# Patient Record
(demographics unavailable — no encounter records)

---

## 2024-11-05 NOTE — HISTORY OF PRESENT ILLNESS
[FreeTextEntry1] : 37-year-old woman with history of venous thromboembolism, lumbar radiculopathy, and venous insufficiency with right lower extremity post-thrombotic syndrome presents for evaluation of right external iliac vein thrombosis. She has significant discomfort and swelling in the right leg. She has difficulty ambulating and/or exercising at times. She has decreased quality of life as a result of her right lower post-thrombotic syndrome and recurrent deep venous thrombosis. She denies claudication or rest pain.

## 2024-11-05 NOTE — PHYSICAL EXAM
Ongoing SW/CM Assessment/Plan of Care Note     See SW/CM flowsheets for goals and other objective data.    Patient/Family discharge goal (s):  Goal #1: Psychosocial needs assessed          PT Recommendation:     Recommendation for Discharge: PT IL: Patient requires  intermittent assistance to perform mobility and/or ADLs safely, Patient is appropriate for Physical Therapy 1-3 times per week    OT Recommendation:     Recommendations for Discharge: OT IL: Patient requires  intermittent assistance to perform mobility and/or ADLs safely            Disposition:Home with HH services       Progress note:   Asked to call patient daughter, per bedside nurse. Call placed Daughter is agreeable to HH services for mom at her apartment. Address verified. Daughter inquired about transportation needs to and from HD. SW assist Cm reaching out to SW.    [facility]  HOME HEALTH  Was [facility] offered: Yes  Was [facility] chosen by patient:Yes            [Respiratory Effort] : normal respiratory effort [Normal Rate and Rhythm] : normal rate and rhythm [2+] : left 2+ [Ankle Swelling (On Exam)] : present [Ankle Swelling On The Left] : moderate [Varicose Veins Of Lower Extremities] : present [Varicose Veins Of The Right Leg] : of the right leg [] : of the right leg [Ankle Swelling On The Right] : mild [Abdomen Tenderness] : ~T ~M No abdominal tenderness [Skin Ulcer] : no ulcer [Alert] : alert [Oriented to Person] : oriented to person [Oriented to Place] : oriented to place [Oriented to Time] : oriented to time [Calm] : calm [de-identified] : appears stated age [de-identified] : normocephalic, atraumatic [de-identified] : supple

## 2024-11-05 NOTE — ASSESSMENT
[FreeTextEntry1] : Problem #1 recurrent subacute/chronic deep venous thrombosis of right external iliac vein Problem #2 post-thrombotic syndrome of right leg due to patient's young age and symptomatology with venous insufficiency and presence of recurrent DVT will schedule for right lower extremity venogram, possible thrombectomy, possible intravascular ultrasound, possible angioplasty, possible stent continue anticoagulation until 24 hours prior to procedure

## 2024-12-05 NOTE — HISTORY OF PRESENT ILLNESS
[FreeTextEntry1] : 37-year-old woman with history of venous thromboembolism, lumbar radiculopathy, and venous insufficiency with right lower extremity post-thrombotic syndrome presents for evaluation of right external iliac vein thrombosis. She has significant discomfort and swelling in the right leg. She has difficulty ambulating and/or exercising at times. She has decreased quality of life as a result of her right lower post-thrombotic syndrome and recurrent deep venous thrombosis. She denies claudication or rest pain.  11/15/24 - R iliac vein thrombectomy, IVUS, PTA and stent [de-identified] : States her right leg feels much better. She also has had improvement in her breathing since the procedure. She does have some intermittent right groin pain radiating around her waist.

## 2024-12-05 NOTE — PHYSICAL EXAM
[Respiratory Effort] : normal respiratory effort [Normal Rate and Rhythm] : normal rate and rhythm [2+] : left 2+ [Ankle Swelling (On Exam)] : present [Varicose Veins Of Lower Extremities] : present [Varicose Veins Of The Right Leg] : of the right leg [] : of the right leg [Ankle Swelling On The Right] : mild [Abdomen Tenderness] : ~T ~M No abdominal tenderness [Skin Ulcer] : no ulcer [Alert] : alert [Oriented to Person] : oriented to person [Oriented to Place] : oriented to place [Oriented to Time] : oriented to time [Calm] : calm [de-identified] : appears stated age [de-identified] : normocephalic, atraumatic [de-identified] : supple

## 2024-12-05 NOTE — HISTORY OF PRESENT ILLNESS
[FreeTextEntry1] : 37-year-old woman with history of venous thromboembolism, lumbar radiculopathy, and venous insufficiency with right lower extremity post-thrombotic syndrome presents for evaluation of right external iliac vein thrombosis. She has significant discomfort and swelling in the right leg. She has difficulty ambulating and/or exercising at times. She has decreased quality of life as a result of her right lower post-thrombotic syndrome and recurrent deep venous thrombosis. She denies claudication or rest pain.  11/15/24 - R iliac vein thrombectomy, IVUS, PTA and stent [de-identified] : States her right leg feels much better. She also has had improvement in her breathing since the procedure. She does have some intermittent right groin pain radiating around her waist.

## 2024-12-05 NOTE — PHYSICAL EXAM
[Respiratory Effort] : normal respiratory effort [Normal Rate and Rhythm] : normal rate and rhythm [2+] : left 2+ [Ankle Swelling (On Exam)] : present [Varicose Veins Of Lower Extremities] : present [Varicose Veins Of The Right Leg] : of the right leg [] : of the right leg [Ankle Swelling On The Right] : mild [Abdomen Tenderness] : ~T ~M No abdominal tenderness [Skin Ulcer] : no ulcer [Alert] : alert [Oriented to Person] : oriented to person [Oriented to Place] : oriented to place [Oriented to Time] : oriented to time [Calm] : calm [de-identified] : appears stated age [de-identified] : normocephalic, atraumatic [de-identified] : supple

## 2024-12-05 NOTE — ASSESSMENT
[FreeTextEntry1] : Problem #1 chronic right external iliac vein thrombosis s/p percutaneous thrombectomy, angioplasty, and stent placement with good result pain and swelling in right lower extremity markedly improved some post-implantation stent related pelvic pain follow up as scheduled

## 2025-05-01 NOTE — PHYSICAL EXAM
[FreeTextEntry2] : Claudia [FreeTextEntry7] : Lipo incisions [Normal] : normal [FreeTextEntry1] : Pimple on left labia majora [FreeTextEntry6] : Ut ~ 16 wks, bulky, irregular, mobile

## 2025-05-01 NOTE — DISCUSSION/SUMMARY
[FreeTextEntry1] : 39 yo P2 w/symptomatic ut myomas. Pt w/HMB, dysmenorrhea, and mass-effect symptoms. H/o DVT/PE x 2, additional DVT x 1 - on Xarelto. Hypercoagulation w/u negative, as per pt.  H/o liposuction and venous thrombectomy Interested in uterine preserving surgery. Not interested in future fertility.  Plan MRI Consider Sonata. Would try to avoid myomectomy TEB for results and plan  Letter to Dr. Janessa Carranza

## 2025-06-26 NOTE — HISTORY OF PRESENT ILLNESS
[FreeTextEntry1] : 37 yo P2 here for f/u after MRI. Pt interested in TTC. H/o DVT/PE x 2 and DVT x 1. Now on Xarelto.

## 2025-06-26 NOTE — DISCUSSION/SUMMARY
[FreeTextEntry1] : 39 yo P2 here for results after MRI. Multiple myomas and adeno Spoke about concerns w/DVT/PEs, adeno, desire to TTC? Pt not ready for hyst. Not candidate for Sonata  Pt curious about IR procedure.  Plan Refer to IR